# Patient Record
Sex: FEMALE | Race: OTHER | NOT HISPANIC OR LATINO
[De-identification: names, ages, dates, MRNs, and addresses within clinical notes are randomized per-mention and may not be internally consistent; named-entity substitution may affect disease eponyms.]

---

## 2017-01-11 ENCOUNTER — APPOINTMENT (OUTPATIENT)
Dept: HEART AND VASCULAR | Facility: CLINIC | Age: 77
End: 2017-01-11

## 2017-01-11 VITALS — WEIGHT: 157 LBS | BODY MASS INDEX: 28.72 KG/M2

## 2017-01-11 VITALS
OXYGEN SATURATION: 96 % | SYSTOLIC BLOOD PRESSURE: 118 MMHG | HEIGHT: 62 IN | WEIGHT: 167 LBS | DIASTOLIC BLOOD PRESSURE: 56 MMHG | BODY MASS INDEX: 30.73 KG/M2 | HEART RATE: 78 BPM

## 2017-09-27 ENCOUNTER — APPOINTMENT (OUTPATIENT)
Dept: HEART AND VASCULAR | Facility: CLINIC | Age: 77
End: 2017-09-27
Payer: MEDICARE

## 2017-09-27 VITALS — SYSTOLIC BLOOD PRESSURE: 114 MMHG | HEART RATE: 80 BPM | OXYGEN SATURATION: 97 % | DIASTOLIC BLOOD PRESSURE: 67 MMHG

## 2017-09-27 PROCEDURE — 99214 OFFICE O/P EST MOD 30 MIN: CPT

## 2018-04-11 ENCOUNTER — APPOINTMENT (OUTPATIENT)
Dept: HEART AND VASCULAR | Facility: CLINIC | Age: 78
End: 2018-04-11
Payer: MEDICARE

## 2018-04-11 VITALS — DIASTOLIC BLOOD PRESSURE: 66 MMHG | SYSTOLIC BLOOD PRESSURE: 113 MMHG | OXYGEN SATURATION: 94 % | HEART RATE: 91 BPM

## 2018-04-11 PROCEDURE — 99214 OFFICE O/P EST MOD 30 MIN: CPT

## 2018-10-31 ENCOUNTER — NON-APPOINTMENT (OUTPATIENT)
Age: 78
End: 2018-10-31

## 2018-10-31 ENCOUNTER — APPOINTMENT (OUTPATIENT)
Dept: HEART AND VASCULAR | Facility: CLINIC | Age: 78
End: 2018-10-31
Payer: MEDICARE

## 2018-10-31 VITALS — OXYGEN SATURATION: 96 % | HEART RATE: 83 BPM | SYSTOLIC BLOOD PRESSURE: 136 MMHG | DIASTOLIC BLOOD PRESSURE: 76 MMHG

## 2018-10-31 PROCEDURE — 99214 OFFICE O/P EST MOD 30 MIN: CPT

## 2018-11-28 ENCOUNTER — FORM ENCOUNTER (OUTPATIENT)
Age: 78
End: 2018-11-28

## 2018-11-29 ENCOUNTER — OUTPATIENT (OUTPATIENT)
Dept: OUTPATIENT SERVICES | Facility: HOSPITAL | Age: 78
LOS: 1 days | End: 2018-11-29
Payer: MEDICARE

## 2018-11-29 DIAGNOSIS — I25.9 CHRONIC ISCHEMIC HEART DISEASE, UNSPECIFIED: ICD-10-CM

## 2018-11-29 LAB — GLUCOSE BLDC GLUCOMTR-MCNC: 116 MG/DL — HIGH (ref 70–99)

## 2018-11-29 PROCEDURE — 93017 CV STRESS TEST TRACING ONLY: CPT

## 2018-11-29 PROCEDURE — 93016 CV STRESS TEST SUPVJ ONLY: CPT

## 2018-11-29 PROCEDURE — 93306 TTE W/DOPPLER COMPLETE: CPT

## 2018-11-29 PROCEDURE — 93306 TTE W/DOPPLER COMPLETE: CPT | Mod: 26

## 2018-11-29 PROCEDURE — 82962 GLUCOSE BLOOD TEST: CPT

## 2018-11-29 PROCEDURE — 78452 HT MUSCLE IMAGE SPECT MULT: CPT

## 2018-11-29 PROCEDURE — 93018 CV STRESS TEST I&R ONLY: CPT

## 2018-11-29 PROCEDURE — 78452 HT MUSCLE IMAGE SPECT MULT: CPT | Mod: 26

## 2018-11-29 PROCEDURE — A9502: CPT

## 2019-07-10 ENCOUNTER — APPOINTMENT (OUTPATIENT)
Dept: HEART AND VASCULAR | Facility: CLINIC | Age: 79
End: 2019-07-10
Payer: MEDICARE

## 2019-07-10 PROCEDURE — 99214 OFFICE O/P EST MOD 30 MIN: CPT

## 2019-07-10 NOTE — HISTORY OF PRESENT ILLNESS
[FreeTextEntry1] : 78 year-old female PMHx HTN, known CAD x 3 stents to RCA in 2010, HLD, and hypothyroidism. . No chest pain/SOB/PND/lower extremity edema.  She is able to walk a hour after lunch every day.\par \par Stress test 11/29/18 normal no myocardial ischemia, LVEF 66%\par Echo 11/29/18 EF 60%, grade I diastolic dysfunction

## 2020-03-11 ENCOUNTER — NON-APPOINTMENT (OUTPATIENT)
Age: 80
End: 2020-03-11

## 2020-03-11 ENCOUNTER — APPOINTMENT (OUTPATIENT)
Dept: HEART AND VASCULAR | Facility: CLINIC | Age: 80
End: 2020-03-11
Payer: MEDICARE

## 2020-03-11 VITALS — DIASTOLIC BLOOD PRESSURE: 72 MMHG | SYSTOLIC BLOOD PRESSURE: 125 MMHG | HEART RATE: 69 BPM | OXYGEN SATURATION: 96 %

## 2020-03-11 PROCEDURE — 99214 OFFICE O/P EST MOD 30 MIN: CPT

## 2020-03-11 RX ORDER — METOPROLOL SUCCINATE 50 MG/1
50 TABLET, EXTENDED RELEASE ORAL DAILY
Qty: 90 | Refills: 3 | Status: ACTIVE | COMMUNITY
Start: 2020-03-11 | End: 1900-01-01

## 2020-09-16 ENCOUNTER — NON-APPOINTMENT (OUTPATIENT)
Age: 80
End: 2020-09-16

## 2020-09-16 ENCOUNTER — APPOINTMENT (OUTPATIENT)
Dept: HEART AND VASCULAR | Facility: CLINIC | Age: 80
End: 2020-09-16
Payer: MEDICARE

## 2020-09-16 VITALS
SYSTOLIC BLOOD PRESSURE: 149 MMHG | HEART RATE: 82 BPM | DIASTOLIC BLOOD PRESSURE: 80 MMHG | BODY MASS INDEX: 26.68 KG/M2 | HEIGHT: 62 IN | WEIGHT: 145 LBS

## 2020-09-16 PROCEDURE — 99214 OFFICE O/P EST MOD 30 MIN: CPT

## 2021-05-19 ENCOUNTER — APPOINTMENT (OUTPATIENT)
Dept: HEART AND VASCULAR | Facility: CLINIC | Age: 81
End: 2021-05-19